# Patient Record
Sex: FEMALE | Race: WHITE | NOT HISPANIC OR LATINO | Employment: FULL TIME | URBAN - METROPOLITAN AREA
[De-identification: names, ages, dates, MRNs, and addresses within clinical notes are randomized per-mention and may not be internally consistent; named-entity substitution may affect disease eponyms.]

---

## 2023-04-02 ENCOUNTER — OFFICE VISIT (OUTPATIENT)
Dept: URGENT CARE | Age: 28
End: 2023-04-02

## 2023-04-02 VITALS
HEIGHT: 61 IN | OXYGEN SATURATION: 98 % | HEART RATE: 96 BPM | BODY MASS INDEX: 29.64 KG/M2 | WEIGHT: 157 LBS | TEMPERATURE: 98.1 F | RESPIRATION RATE: 20 BRPM

## 2023-04-02 DIAGNOSIS — J22 LOWER RESPIRATORY INFECTION: Primary | ICD-10-CM

## 2023-04-02 RX ORDER — ALBUTEROL SULFATE 90 UG/1
2 AEROSOL, METERED RESPIRATORY (INHALATION) EVERY 6 HOURS PRN
Qty: 8 G | Refills: 0 | Status: SHIPPED | OUTPATIENT
Start: 2023-04-02

## 2023-04-02 RX ORDER — AZITHROMYCIN 250 MG/1
TABLET, FILM COATED ORAL
Qty: 6 TABLET | Refills: 0 | Status: SHIPPED | OUTPATIENT
Start: 2023-04-02 | End: 2023-04-06

## 2023-04-02 RX ORDER — AZELASTINE 1 MG/ML
1 SPRAY, METERED NASAL 2 TIMES DAILY
Qty: 1 ML | Refills: 0 | Status: SHIPPED | OUTPATIENT
Start: 2023-04-02

## 2023-04-02 RX ORDER — BROMPHENIRAMINE MALEATE, PSEUDOEPHEDRINE HYDROCHLORIDE, AND DEXTROMETHORPHAN HYDROBROMIDE 2; 30; 10 MG/5ML; MG/5ML; MG/5ML
10 SYRUP ORAL 4 TIMES DAILY PRN
Qty: 120 ML | Refills: 0 | Status: SHIPPED | OUTPATIENT
Start: 2023-04-02

## 2023-04-02 NOTE — PROGRESS NOTES
[facility]        NAME: Griffin Townsend is a 32 y o  female  : 1995    MRN: [id number]  DATE: 2023  TIME: 10:54 AM    Assessment and Plan   Lower respiratory infection [J22]  1  Lower respiratory infection  azithromycin (ZITHROMAX) 250 mg tablet    brompheniramine-pseudoephedrine-DM 30-2-10 MG/5ML syrup    azelastine (ASTELIN) 0 1 % nasal spray    albuterol (PROVENTIL HFA,VENTOLIN HFA) 90 mcg/act inhaler            Patient Instructions     Take antibiotic as directed until completed  Use additional medications as directed for symptomatic relief as needed  Motrin and or Tylenol as needed for fever pain  Follow up with PCP in 3-5 days  Proceed to  ER if symptoms worsen  Chief Complaint     Chief Complaint   Patient presents with   • Cough     Cough, chest congestion, nasal congestion x 2 weeks         History of Present Illness       25-year-old female presents with runny nose congestion cough feeling rundown  Patient reports 2 weeks ago was diagnosed with the flu  Patient reports she was starting to feel better and then all of a sudden started to get worse again  Denies any fevers or chills  No abdominal pain or vomiting  Has had some bouts of nausea from time to time  Denies any sore throat or ear pain  Cough  This is a new problem  The current episode started 1 to 4 weeks ago  The problem has been gradually worsening  The problem occurs constantly  The cough is non-productive  Associated symptoms include nasal congestion, postnasal drip and rhinorrhea  Pertinent negatives include no chest pain, chills, ear pain, fever or shortness of breath  Nothing aggravates the symptoms  She has tried prescription cough suppressant for the symptoms  The treatment provided no relief  There is no history of asthma  Review of Systems   Review of Systems   Constitutional: Negative  Negative for chills and fever  HENT: Positive for postnasal drip and rhinorrhea  Negative for ear pain  "  Eyes: Negative  Respiratory: Positive for cough  Negative for shortness of breath  Cardiovascular: Negative  Negative for chest pain  Gastrointestinal: Negative  Musculoskeletal: Negative  Skin: Negative  Neurological: Negative  Current Medications       Current Outpatient Medications:   •  albuterol (PROVENTIL HFA,VENTOLIN HFA) 90 mcg/act inhaler, Inhale 2 puffs every 6 (six) hours as needed for wheezing or shortness of breath, Disp: 8 g, Rfl: 0  •  azelastine (ASTELIN) 0 1 % nasal spray, 1 spray into each nostril 2 (two) times a day Use in each nostril as directed, Disp: 1 mL, Rfl: 0  •  azithromycin (ZITHROMAX) 250 mg tablet, Take 2 tablets today then 1 tablet daily x 4 days, Disp: 6 tablet, Rfl: 0  •  brompheniramine-pseudoephedrine-DM 30-2-10 MG/5ML syrup, Take 10 mL by mouth 4 (four) times a day as needed for congestion or cough, Disp: 120 mL, Rfl: 0    Current Allergies     Allergies as of 04/02/2023   • (No Known Allergies)            The following portions of the patient's history were reviewed and updated as appropriate: allergies, current medications, past family history, past medical history, past social history, past surgical history and problem list      History reviewed  No pertinent past medical history  History reviewed  No pertinent surgical history  History reviewed  No pertinent family history  Medications have been verified  Objective   Pulse 96   Temp 98 1 °F (36 7 °C)   Resp 20   Ht 5' 1\" (1 549 m)   Wt 71 2 kg (157 lb)   SpO2 98%   BMI 29 66 kg/m²   No LMP recorded  Physical Exam     Physical Exam  Vitals and nursing note reviewed  Constitutional:       General: She is not in acute distress  Appearance: Normal appearance  She is well-developed  HENT:      Head: Normocephalic and atraumatic  Right Ear: Hearing, tympanic membrane, ear canal and external ear normal  There is no impacted cerumen        Left Ear: Hearing, " tympanic membrane, ear canal and external ear normal  There is no impacted cerumen  Nose: Congestion and rhinorrhea present  Mouth/Throat:      Pharynx: Uvula midline  No oropharyngeal exudate  Eyes:      General:         Right eye: No discharge  Left eye: No discharge  Conjunctiva/sclera: Conjunctivae normal    Cardiovascular:      Rate and Rhythm: Normal rate and regular rhythm  Heart sounds: Normal heart sounds  No murmur heard  Pulmonary:      Effort: Pulmonary effort is normal  No respiratory distress  Breath sounds: Decreased breath sounds (Mild throughout) present  No wheezing, rhonchi or rales  Abdominal:      General: Bowel sounds are normal       Palpations: Abdomen is soft  Tenderness: There is no abdominal tenderness  Musculoskeletal:         General: Normal range of motion  Cervical back: Normal range of motion and neck supple  Lymphadenopathy:      Cervical: No cervical adenopathy  Skin:     General: Skin is warm and dry  Neurological:      Mental Status: She is alert and oriented to person, place, and time     Psychiatric:         Mood and Affect: Mood normal

## 2023-04-02 NOTE — PATIENT INSTRUCTIONS
Take antibiotic as directed until completed  Use additional medications as directed for symptomatic relief as needed  Motrin and or Tylenol as needed for fever pain  Follow up with PCP in 3-5 days  Proceed to  ER if symptoms worsen  Pneumonia   AMBULATORY CARE:   Pneumonia  is an infection in your lungs caused by bacteria, viruses, fungi, or parasites  You can become infected if you come in contact with someone who is sick  You can get pneumonia if you recently had surgery or needed a ventilator to help you breathe  Pneumonia can also be caused by accidentally inhaling saliva or small pieces of food  Pneumonia may cause mild symptoms, or it can be severe and life-threatening  Common symptoms include the following:   Fever or chills    Cough    Shortness of breath or rapid breathing    Chest pain when you cough or breathe deeply    Headache    Vomiting    Fatigue or confusion    Seek care immediately if:   You cough up blood  Your heart beats more than 100 beats in 1 minute  You are very tired, confused, and cannot think clearly  You have chest pain or trouble breathing  Your lips or fingernails turn gray or blue  Call your doctor if:   Your symptoms are the same or get worse 48 hours after you start antibiotics  Your fever is not below 99°F (37 2°C) 48 hours after you start antibiotics  You have a fever higher than 101°F (38 3°C)  You cannot eat, or you have loss of appetite, nausea, or are vomiting  You have questions or concerns about your condition or care  Treatment  may include any of the following:  Antibiotics  help treat pneumonia caused by bacteria  Acetaminophen  decreases pain and fever  It is available without a doctor's order  Ask how much to take and how often to take it  Follow directions   Read the labels of all other medicines you are using to see if they also contain acetaminophen, or ask your doctor or pharmacist  Acetaminophen can cause liver damage if not taken correctly  NSAIDs , such as ibuprofen, help decrease swelling, pain, and fever  This medicine is available with or without a doctor's order  NSAIDs can cause stomach bleeding or kidney problems in certain people  If you take blood thinner medicine, always ask your healthcare provider if NSAIDs are safe for you  Always read the medicine label and follow directions  Airway clearance techniques  are exercises to help remove mucus so you can breathe more easily  Your healthcare provider will show you how to do the exercises  These exercises may be used along with machines or devices to help decrease your symptoms  Respiratory support  is given to help you breathe  You may receive oxygen to increase the level of oxygen in your blood  You may also need a machine to help you breathe  Manage your symptoms:   Rest as needed  Rest often throughout the day  Alternate times of activity with times of rest     Drink liquids as directed  Ask how much liquid to drink each day and which liquids are best for you  Liquids help thin your mucus, which may make it easier for you to cough it up  Do not smoke  Smoking increases your risk for pneumonia  Smoking also makes it harder for you to get better after you have had pneumonia  Ask your healthcare provider for information if you need help to quit smoking  Avoid secondhand smoke  Limit alcohol  Women should limit alcohol to 1 drink a day  Men should limit alcohol to 2 drinks a day  A drink of alcohol is 12 ounces of beer, 5 ounces of wine, or 1½ ounces of liquor  Use a cool mist humidifier  A humidifier will help increase air moisture in your home  This may make it easier for you to breathe and help decrease your cough  Keep your head elevated  You may be able to breathe better if you keep your head and upper body elevated  Prevent pneumonia:   Wash your hands often  Use soap and water  Wash for at least 20 seconds   Rinse with warm, running water for several seconds  Then dry your hands with a clean towel or paper towel  Use hand  that contains alcohol if soap and water are not available  Do not touch your eyes, nose, or mouth without washing your hands first          Cover a sneeze or cough  Use a tissue that covers your mouth and nose  Throw the tissue away in a trash can right away  Use the bend of your arm if a tissue is not available  Wash your hands well with soap and water or use a hand   Do not stand close to anyone who is sneezing or coughing  Stay away from others until you are well  Do not go to work or other activities  Wait until your symptoms are gone or your healthcare provider says it is okay to return  Ask about vaccines you may need  A pneumonia vaccine can help lower your risk for pneumonia  The vaccine may be recommended every 5 years, starting at age 72  Other vaccines help lower the risk for infections that can become serious for a person who has pneumonia  Get a flu vaccine each year as soon as recommended, usually in September or October  Get a COVID-19 vaccine and booster as directed  Your healthcare provider can tell you if you should also get other vaccines, and when to get them  Follow up with your doctor as directed: You will need to return for more tests  Write down your questions so you remember to ask them during your visits  © Copyright [employer] 2022 Information is for End User's use only and may not be sold, redistributed or otherwise used for commercial purposes  The above information is an  only  It is not intended as medical advice for individual conditions or treatments  Talk to your doctor, nurse or pharmacist before following any medical regimen to see if it is safe and effective for you

## 2024-10-15 ENCOUNTER — APPOINTMENT (OUTPATIENT)
Dept: RADIOLOGY | Age: 29
End: 2024-10-15
Payer: COMMERCIAL

## 2024-10-15 ENCOUNTER — OFFICE VISIT (OUTPATIENT)
Dept: URGENT CARE | Age: 29
End: 2024-10-15
Payer: COMMERCIAL

## 2024-10-15 VITALS
RESPIRATION RATE: 18 BRPM | DIASTOLIC BLOOD PRESSURE: 72 MMHG | OXYGEN SATURATION: 98 % | SYSTOLIC BLOOD PRESSURE: 126 MMHG | HEART RATE: 71 BPM | BODY MASS INDEX: 30.04 KG/M2 | WEIGHT: 159 LBS | TEMPERATURE: 98.1 F

## 2024-10-15 DIAGNOSIS — S63.501A SPRAIN OF RIGHT WRIST, INITIAL ENCOUNTER: ICD-10-CM

## 2024-10-15 DIAGNOSIS — S49.91XA INJURY OF RIGHT UPPER EXTREMITY, INITIAL ENCOUNTER: Primary | ICD-10-CM

## 2024-10-15 DIAGNOSIS — S49.91XA INJURY OF RIGHT UPPER EXTREMITY, INITIAL ENCOUNTER: ICD-10-CM

## 2024-10-15 PROCEDURE — 73110 X-RAY EXAM OF WRIST: CPT

## 2024-10-15 PROCEDURE — 73090 X-RAY EXAM OF FOREARM: CPT

## 2024-10-15 PROCEDURE — S9083 URGENT CARE CENTER GLOBAL: HCPCS | Performed by: EMERGENCY MEDICINE

## 2024-10-15 PROCEDURE — G0382 LEV 3 HOSP TYPE B ED VISIT: HCPCS | Performed by: EMERGENCY MEDICINE

## 2024-10-15 RX ORDER — VENLAFAXINE HYDROCHLORIDE 37.5 MG/1
37.5 CAPSULE, EXTENDED RELEASE ORAL DAILY
COMMUNITY
Start: 2024-09-06

## 2024-10-15 RX ORDER — NAPROXEN 500 MG/1
500 TABLET ORAL 2 TIMES DAILY WITH MEALS
Qty: 60 TABLET | Refills: 0 | Status: SHIPPED | OUTPATIENT
Start: 2024-10-15 | End: 2024-11-14

## 2024-10-15 NOTE — PROGRESS NOTES
[facility]        NAME: Jenae Bose is a 29 y.o. female  : 1995    MRN: [id number]  DATE: October 15, 2024  TIME: 5:41 PM    Assessment and Plan   Injury of right upper extremity, initial encounter [S49.91XA]  1. Injury of right upper extremity, initial encounter  XR wrist 3+ vw right    XR forearm 2 vw right    naproxen (Naprosyn) 500 mg tablet    Ambulatory Referral to Orthopedic Surgery      2. Sprain of right wrist, initial encounter  Ambulatory Referral to Orthopedic Surgery            Patient Instructions       Follow up with PCP in 3-5 days.  Proceed to  ER if symptoms worsen.    If tests have been performed at [facility], our office will contact you with results if changes need to be made to the care plan discussed with you at the visit.  You can review your full results on [facility] MyChart.    Chief Complaint     Chief Complaint   Patient presents with    Wrist Injury     Right wrist  States was walking and fell and used arm to brace fall  Sates pain is in wrist and lower arm         History of Present Illness       29-year-old right-handed female presents with a chief complaint of right wrist and forearm pain after falling on outstretched hand approximately 2 days ago while on a balcony.  Patient states that she initially fell onto her outstretched right hand and noticed pain immediately afterward.  She states that she has noticed some mild swelling on the medial portion of her right wrist as well as pain with movement since the injury.  She denies any numbness tingling or weakness in the right hand.  Denies additional injury, head strike or loss of consciousness at time of injury.    Wrist Pain   The pain is present in the right wrist and right arm. This is a new problem. The current episode started in the past 7 days. There has been a history of trauma. The problem occurs constantly. The problem has been waxing and waning. The pain is at a severity of 5/10. The pain is moderate.  Associated symptoms include joint swelling and stiffness. Pertinent negatives include no fever, inability to bear weight, itching, joint locking, limited range of motion, numbness or tingling. The symptoms are aggravated by activity. She has tried nothing for the symptoms.       Review of Systems   Review of Systems   Constitutional:  Negative for activity change, appetite change, chills, diaphoresis, fatigue, fever and unexpected weight change.   HENT:  Negative for congestion, dental problem, drooling, ear discharge, ear pain, facial swelling, hearing loss, mouth sores, nosebleeds, postnasal drip, rhinorrhea, sinus pressure, sinus pain, sneezing, sore throat, tinnitus, trouble swallowing and voice change.    Eyes:  Negative for pain and visual disturbance.   Respiratory:  Negative for cough and shortness of breath.    Cardiovascular:  Negative for chest pain and palpitations.   Gastrointestinal:  Negative for abdominal pain and vomiting.   Genitourinary:  Negative for dysuria and hematuria.   Musculoskeletal:  Positive for arthralgias, joint swelling and stiffness. Negative for back pain, gait problem, myalgias, neck pain and neck stiffness.   Skin:  Negative for color change, itching, pallor, rash and wound.   Neurological:  Negative for dizziness, tingling, tremors, seizures, syncope, facial asymmetry, speech difficulty, weakness, light-headedness, numbness and headaches.   All other systems reviewed and are negative.        Current Medications       Current Outpatient Medications:     naproxen (Naprosyn) 500 mg tablet, Take 1 tablet (500 mg total) by mouth 2 (two) times a day with meals, Disp: 60 tablet, Rfl: 0    venlafaxine (EFFEXOR-XR) 37.5 mg 24 hr capsule, Take 37.5 mg by mouth daily, Disp: , Rfl:     albuterol (PROVENTIL HFA,VENTOLIN HFA) 90 mcg/act inhaler, Inhale 2 puffs every 6 (six) hours as needed for wheezing or shortness of breath (Patient not taking: Reported on 10/15/2024), Disp: 8 g, Rfl: 0     azelastine (ASTELIN) 0.1 % nasal spray, 1 spray into each nostril 2 (two) times a day Use in each nostril as directed (Patient not taking: Reported on 10/15/2024), Disp: 1 mL, Rfl: 0    brompheniramine-pseudoephedrine-DM 30-2-10 MG/5ML syrup, Take 10 mL by mouth 4 (four) times a day as needed for congestion or cough (Patient not taking: Reported on 10/15/2024), Disp: 120 mL, Rfl: 0    Current Allergies     Allergies as of 10/15/2024    (No Known Allergies)            The following portions of the patient's history were reviewed and updated as appropriate: allergies, current medications, past family history, past medical history, past social history, past surgical history and problem list.     No past medical history on file.    No past surgical history on file.    No family history on file.      Medications have been verified.        Objective   /72   Pulse 71   Temp 98.1 °F (36.7 °C)   Resp 18   Wt 72.1 kg (159 lb)   SpO2 98%   BMI 30.04 kg/m²   No LMP recorded.       Physical Exam     Physical Exam  Vitals and nursing note reviewed.   Constitutional:       General: She is not in acute distress.     Appearance: Normal appearance. She is not ill-appearing, toxic-appearing or diaphoretic.   HENT:      Head: Normocephalic and atraumatic.      Right Ear: External ear normal.      Left Ear: External ear normal.      Nose: Nose normal.      Mouth/Throat:      Mouth: Mucous membranes are moist.      Pharynx: No oropharyngeal exudate or posterior oropharyngeal erythema.   Eyes:      General: No scleral icterus.        Right eye: No discharge.         Left eye: No discharge.      Extraocular Movements: Extraocular movements intact.      Pupils: Pupils are equal, round, and reactive to light.   Cardiovascular:      Rate and Rhythm: Normal rate and regular rhythm.      Pulses: Normal pulses.           Carotid pulses are 2+ on the right side and 2+ on the left side.       Radial pulses are 2+ on the right side and  2+ on the left side.      Heart sounds: No murmur heard.     No friction rub. No gallop.   Pulmonary:      Effort: Pulmonary effort is normal. No respiratory distress.      Breath sounds: Normal breath sounds. No stridor. No wheezing, rhonchi or rales.   Chest:      Chest wall: No tenderness.   Abdominal:      Tenderness: There is no guarding.   Musculoskeletal:         General: Swelling, tenderness and signs of injury present. No deformity.      Right shoulder: Normal.      Left shoulder: Normal.      Right upper arm: Normal.      Left upper arm: Normal.      Right elbow: Normal.      Left elbow: Normal.      Right forearm: Swelling, tenderness and bony tenderness present. No edema, deformity or lacerations.      Left forearm: Normal.      Right wrist: Swelling, tenderness and bony tenderness present. No deformity, effusion, lacerations, snuff box tenderness or crepitus. Normal range of motion. Normal pulse.      Left wrist: Normal.      Right hand: Normal.      Left hand: Normal.      Cervical back: Normal, normal range of motion and neck supple.      Thoracic back: Normal.      Lumbar back: Normal.      Right lower leg: No edema.      Left lower leg: No edema.   Skin:     General: Skin is warm and dry.      Coloration: Skin is not jaundiced or pale.      Findings: No bruising, erythema, lesion or rash.   Neurological:      General: No focal deficit present.      Mental Status: She is alert and oriented to person, place, and time.      Cranial Nerves: No cranial nerve deficit.      Sensory: No sensory deficit.      Motor: No weakness.      Coordination: Coordination normal.      Gait: Gait normal.   Psychiatric:         Mood and Affect: Mood normal.         Behavior: Behavior normal.

## 2024-10-15 NOTE — LETTER
October 15, 2024     Patient: Jenae Bose   YOB: 1995   Date of Visit: 10/15/2024       To Whom It May Concern:    It is my medical opinion that Jenae Bose may return to light duty immediately with the following restrictions: please allow wear of wrist brace as needed,  . Repetitive motion activities (w or w/o splint) not more than 4 times/hr; repetitive keying up to 15 keystrokes/min not more than 2 hrs/day; gripping and using light tools (pens, scissors, etc.) with 5-minute break at least every 20 min; no pinching; driving car up to 2 hrs/day; light work up to 5 lbs [2 kg] 3 times/hr; avoidance of prolonged periods in wrist flexion or extension.    If you have any questions or concerns, please don't hesitate to call.         Sincerely,        Alexander Montemayor, DO    CC: No Recipients

## 2024-10-23 ENCOUNTER — OFFICE VISIT (OUTPATIENT)
Dept: OBGYN CLINIC | Facility: MEDICAL CENTER | Age: 29
End: 2024-10-23
Payer: COMMERCIAL

## 2024-10-23 ENCOUNTER — APPOINTMENT (OUTPATIENT)
Dept: RADIOLOGY | Facility: MEDICAL CENTER | Age: 29
End: 2024-10-23
Payer: COMMERCIAL

## 2024-10-23 VITALS
DIASTOLIC BLOOD PRESSURE: 85 MMHG | HEIGHT: 61 IN | HEART RATE: 73 BPM | BODY MASS INDEX: 30.02 KG/M2 | WEIGHT: 159 LBS | SYSTOLIC BLOOD PRESSURE: 126 MMHG

## 2024-10-23 DIAGNOSIS — S63.501A SPRAIN OF RIGHT WRIST, INITIAL ENCOUNTER: ICD-10-CM

## 2024-10-23 DIAGNOSIS — S49.91XA INJURY OF RIGHT UPPER EXTREMITY, INITIAL ENCOUNTER: ICD-10-CM

## 2024-10-23 PROCEDURE — 73100 X-RAY EXAM OF WRIST: CPT

## 2024-10-23 PROCEDURE — 99203 OFFICE O/P NEW LOW 30 MIN: CPT | Performed by: ORTHOPAEDIC SURGERY

## 2024-10-23 NOTE — PROGRESS NOTES
The HAND & UPPER EXTREMITY OFFICE VISIT   Referred By:  Alexander Montemayor, Do  [address] [place] [address]      Chief Complaint:     Right wrist pain    History of Present Illness:   29 y.o., right hand dominant female presents with right wrist pain that began after a fall onto her outstretched hand 10/13/2024. She did notice immediate pain and swelling, with difficulty moving the wrist since the injury. She presented to a [facility] two days after the injury where x-rays were obtained and did not demonstrate any acute fracture. She describes intermittent pain that has been gradually improving, aching and uncomfortable in nature. Pain also occurs in the volar aspect of her small finger with associated weakness and catching sensation. Pain is worsened with motion of the wrist, pinching, lifting. Pain is improved with rest, ice, Naproxen, and use of cock up wrist brace.       ADLs: Community ambulator  Smoke: Denies   ETOH: socially   Drugs:  Denies  Job:        Past Medical History:  No past medical history on file.  No past surgical history on file.  No family history on file.  Social History     Socioeconomic History    Marital status: Single     Spouse name: Not on file    Number of children: Not on file    Years of education: Not on file    Highest education level: Not on file   Occupational History    Not on file   Tobacco Use    Smoking status: Never    Smokeless tobacco: Never   Substance and Sexual Activity    Alcohol use: Never    Drug use: Never    Sexual activity: Not on file   Other Topics Concern    Not on file   Social History Narrative    Not on file     Social Determinants of Health     Financial Resource Strain: Not on file   Food Insecurity: Not on file   Transportation Needs: Not on file   Physical Activity: Not on file   Stress: Not on file   Social Connections: Not on file   Intimate Partner Violence: Not on file   Housing Stability: Not on file     Scheduled  "Meds:  Continuous Infusions:No current facility-administered medications for this visit.    PRN Meds:.  No Known Allergies        Physical Examination:    /85   Pulse 73   Ht 5' 1\" (1.549 m)   Wt 72.1 kg (159 lb)   BMI 30.04 kg/m²     Gen: A&Ox3, NAD  Cardiac: regular rate  Chest: non labored breathing  Abdomen: Non-distended    Cervcial Spine: Not tested     Right Upper Extremity:  Skin CDI  No obvious deformity of the shoulder, arm, elbow, forearm, wrist, hand  TTP TFCC fovea,  ulnar joint line, hook of hamate  ROM limited due to stiffness  5/5 strength with wrist flexion, extension, finger flexion and extension  Brisk capillary refill  Sensation intact to Ax/R/M/U nerve distributions      Studies:  Radiographs: I personally reviewed and independently interpreted the available radiographs.  10/15/2024: Radiographs of the right wrist, multiple views, demonstrate no acute osseous abnormalities.   10/23/2024: Radiographs of the right wrist, carpal tunnel view, demonstrate no fracture of the hook of the hamate or any other osseous abnormalities      Assessment and Plan:  1. Injury of right upper extremity, initial encounter  Ambulatory Referral to Orthopedic Surgery    XR wrist 2 vw right      2. Sprain of right wrist, initial encounter  Ambulatory Referral to Orthopedic Surgery    XR wrist 2 vw right          29 y.o. female presents with signs and symptoms consistent with the above diagnosis.  We discussed the natural history of this condition and its pathogenesis.  We discussed operative and nonoperative treatment options.  X-ray of the right wrist with carpal tunnel view obtained today and reviewed with the patient to confirm absence of hook of the hamate fracture given complaints of catching with flexion and extension of the fingers. No catching or abnormal movement on exam today.  With no evidence of fracture on x-ray, a suspect a joint sprain that should continue to gradually improve with nonoperative " treatments including continuation of cock-up wrist splint as needed, ice and oral medication as needed for pain relief.  She should perform range of motion exercises of the wrist and hand at least twice daily to maintain range of motion.  If symptoms persist or worsen, we can consider ordering an MRI to further evaluate for cause of her pain.  Note was provided today allowing Jenae to return to work on light duty for the right upper extremity for the next 2 weeks.    she expressed understanding of the plan and agreed. We encouraged them to contact our office with any questions or concerns.         Epifanio Dominguez MD  Hand and Upper Extremity Surgery    Scribe Attestation      I,:  Alison Crooks am acting as a scribe while in the presence of the attending physician.:       I,:  Epifanio Dominguez MD personally performed the services described in this documentation    as scribed in my presence.:               *This note was dictated using Dragon voice recognition software. Please excuse any word substitutions or errors.*

## 2024-10-23 NOTE — LETTER
October 23, 2024     Patient: Jenae Bose  YOB: 1995  Date of Visit: 10/23/2024      To Whom it May Concern:    Jenae Bose is under my professional care. Jenae was seen in my office on 10/23/2024. Jenae may return to work with limitations light duty right upper extremity for the next two weeks .    If you have any questions or concerns, please don't hesitate to call.         Sincerely,          Epifanio Dominguez MD        CC: No Recipients

## 2024-10-24 ENCOUNTER — TELEPHONE (OUTPATIENT)
Age: 29
End: 2024-10-24

## 2024-10-24 NOTE — TELEPHONE ENCOUNTER
Caller: Patient     Doctor: Alberto,     Reason for call: Patient called regarding there work note. Patient states that the restrictions for light duty need to be specify. Patient stated the work note from 10/15 from urgent care list the restrictions that can be followed. Please adjust work note on 10/23. Please call patient with updated note.    Call back#: [phone]

## 2025-01-07 ENCOUNTER — OFFICE VISIT (OUTPATIENT)
Dept: URGENT CARE | Age: 30
End: 2025-01-07
Payer: COMMERCIAL

## 2025-01-07 VITALS
HEART RATE: 88 BPM | SYSTOLIC BLOOD PRESSURE: 118 MMHG | RESPIRATION RATE: 18 BRPM | DIASTOLIC BLOOD PRESSURE: 84 MMHG | TEMPERATURE: 100.3 F | OXYGEN SATURATION: 98 %

## 2025-01-07 DIAGNOSIS — J01.00 ACUTE NON-RECURRENT MAXILLARY SINUSITIS: Primary | ICD-10-CM

## 2025-01-07 PROCEDURE — G0382 LEV 3 HOSP TYPE B ED VISIT: HCPCS | Performed by: EMERGENCY MEDICINE

## 2025-01-07 PROCEDURE — S9083 URGENT CARE CENTER GLOBAL: HCPCS | Performed by: EMERGENCY MEDICINE

## 2025-01-07 RX ORDER — METHYLPREDNISOLONE 4 MG/1
TABLET ORAL
Qty: 21 TABLET | Refills: 0 | Status: SHIPPED | OUTPATIENT
Start: 2025-01-07

## 2025-01-07 NOTE — PROGRESS NOTES
[facility]        NAME: Jenae Bose is a 29 y.o. female  : 1995    MRN: [id number]  DATE: 2025  TIME: 8:12 AM    Assessment and Plan   Acute non-recurrent maxillary sinusitis [J01.00]  1. Acute non-recurrent maxillary sinusitis          Maxillary sinus congestion, pain. Patient states her teeth hurt. Has tried many OTC medications and is still congested. Sinus pressure / TTP. BS clear.     Patient Instructions       Follow up with PCP in 3-5 days.  Proceed to  ER if symptoms worsen.    If tests have been performed at [facility], our office will contact you with results if changes need to be made to the care plan discussed with you at the visit.  You can review your full results on [facility].    Chief Complaint     Chief Complaint   Patient presents with    Cold Like Symptoms     Ongoing since Saturday night  Has been having congestion, facial pain behind nose, eyes, and ears   Generalized body aches          History of Present Illness       Maxillary sinus congestion, pain. Patient states her teeth hurt. Has tried many OTC medications and is still congested. Sinus pressure / TTP. BS clear.         Review of Systems   Review of Systems   Constitutional:  Negative for fever.   HENT:  Positive for congestion, postnasal drip, sinus pressure and sinus pain.    Respiratory:  Negative for cough and wheezing.    All other systems reviewed and are negative.        Current Medications       Current Outpatient Medications:     venlafaxine (EFFEXOR-XR) 37.5 mg 24 hr capsule, Take 37.5 mg by mouth daily, Disp: , Rfl:     albuterol (PROVENTIL HFA,VENTOLIN HFA) 90 mcg/act inhaler, Inhale 2 puffs every 6 (six) hours as needed for wheezing or shortness of breath (Patient not taking: Reported on 10/15/2024), Disp: 8 g, Rfl: 0    azelastine (ASTELIN) 0.1 % nasal spray, 1 spray into each nostril 2 (two) times a day Use in each nostril as directed (Patient not taking: Reported on 10/15/2024), Disp:  1 mL, Rfl: 0    brompheniramine-pseudoephedrine-DM 30-2-10 MG/5ML syrup, Take 10 mL by mouth 4 (four) times a day as needed for congestion or cough (Patient not taking: Reported on 10/15/2024), Disp: 120 mL, Rfl: 0    naproxen (Naprosyn) 500 mg tablet, Take 1 tablet (500 mg total) by mouth 2 (two) times a day with meals, Disp: 60 tablet, Rfl: 0    Current Allergies     Allergies as of 01/07/2025    (No Known Allergies)            The following portions of the patient's history were reviewed and updated as appropriate: allergies, current medications, past family history, past medical history, past social history, past surgical history and problem list.     No past medical history on file.    No past surgical history on file.    No family history on file.      Medications have been verified.        Objective   /84   Pulse 88   Temp 100.3 °F (37.9 °C)   Resp 18   SpO2 98%   No LMP recorded.       Physical Exam     Physical Exam  Vitals reviewed.   Constitutional:       Appearance: Normal appearance.   HENT:      Nose: Congestion and rhinorrhea present.      Right Sinus: Maxillary sinus tenderness present.      Left Sinus: Maxillary sinus tenderness present.   Cardiovascular:      Rate and Rhythm: Normal rate and regular rhythm.      Pulses: Normal pulses.      Heart sounds: Normal heart sounds.   Pulmonary:      Effort: Pulmonary effort is normal.      Breath sounds: Normal breath sounds.   Lymphadenopathy:      Cervical: No cervical adenopathy.   Neurological:      Mental Status: She is alert.

## 2025-01-07 NOTE — PATIENT INSTRUCTIONS
Pseudoephedrine ( 30 mg)     Benadryl at night    Vicks    Mucinex    Flonase     May consider Afrin nasal spray ( NO MORE THAN 2 DAYS)

## 2025-01-13 ENCOUNTER — TELEMEDICINE (OUTPATIENT)
Dept: OTHER | Facility: HOSPITAL | Age: 30
End: 2025-01-13
Payer: COMMERCIAL

## 2025-01-13 DIAGNOSIS — R11.0 NAUSEA: Primary | ICD-10-CM

## 2025-01-13 DIAGNOSIS — R09.82 POST-NASAL DRIP: ICD-10-CM

## 2025-01-13 PROCEDURE — 99213 OFFICE O/P EST LOW 20 MIN: CPT | Performed by: NURSE PRACTITIONER

## 2025-01-13 RX ORDER — FLUTICASONE PROPIONATE 50 MCG
1 SPRAY, SUSPENSION (ML) NASAL DAILY
Qty: 9.9 ML | Refills: 0 | Status: SHIPPED | OUTPATIENT
Start: 2025-01-13 | End: 2025-01-24

## 2025-01-13 RX ORDER — ONDANSETRON 4 MG/1
4 TABLET, FILM COATED ORAL EVERY 8 HOURS PRN
Qty: 20 TABLET | Refills: 0 | Status: SHIPPED | OUTPATIENT
Start: 2025-01-13 | End: 2025-01-24

## 2025-01-13 NOTE — PROGRESS NOTES
Virtual Regular Visit  Name: Jenae Bose      : 1995      MRN: [id number]  Encounter Provider: LUIS Park  Encounter Date: 2025   Encounter department: VIRTUAL CARE       Verification of patient location:  Patient is located at Home in the following state in which I hold an active license [place] :  Assessment & Plan  Nausea    Orders:    ondansetron (ZOFRAN) 4 mg tablet; Take 1 tablet (4 mg total) by mouth every 8 (eight) hours as needed for nausea or vomiting    Post-nasal drip    Orders:    fluticasone (FLONASE) 50 mcg/act nasal spray; 1 spray into each nostril daily        Encounter provider LUIS Park    The patient was identified by name and date of birth. Jenae Bose was informed that this is a telemedicine visit and that the visit is being conducted through the Epic Embedded platform. She agrees to proceed..  My office door was closed. No one else was in the room.  She acknowledged consent and understanding of privacy and security of the video platform. The patient has agreed to participate and understands they can discontinue the visit at any time.    Patient is aware this is a billable service.     History was obtained from: History obtained from: patient  History of Present Illness     Patient presents via video visit for nausea. Is being treated with augmentin and medrol dose pack for sinus infection. States that infection is improving. PND persists.       Review of Systems   Constitutional:  Negative for chills, diaphoresis, fatigue and fever.   HENT:  Positive for congestion, postnasal drip and rhinorrhea. Negative for ear pain, facial swelling, sinus pressure, sinus pain, sore throat, tinnitus and trouble swallowing.    Eyes: Negative.    Respiratory:  Negative for cough, chest tightness, shortness of breath, wheezing and stridor.    Cardiovascular:  Negative for chest pain and palpitations.   Gastrointestinal:  Positive for nausea. Negative for abdominal pain,  diarrhea and vomiting.   Musculoskeletal:  Negative for arthralgias, back pain, joint swelling, myalgias, neck pain and neck stiffness.   Neurological:  Negative for dizziness, facial asymmetry, weakness, light-headedness, numbness and headaches.       Objective   LMP 01/04/2025     Physical Exam  Constitutional:       General: She is not in acute distress.     Appearance: She is well-developed. She is not diaphoretic.   HENT:      Nose: Congestion present.      Right Sinus: No maxillary sinus tenderness or frontal sinus tenderness.      Left Sinus: No maxillary sinus tenderness or frontal sinus tenderness.      Mouth/Throat:      Tonsils: No tonsillar exudate.   Cardiovascular:      Comments: Unable to assess in this setting  Pulmonary:      Effort: Pulmonary effort is normal.      Comments: Patient able to converse in full sentences, easy non-labored respirations noted. No dyspnea observed.      Lymphadenopathy:      Cervical: No cervical adenopathy.   Skin:     Coloration: Skin is not pale.   Neurological:      Mental Status: She is alert and oriented to person, place, and time.         Visit Time  Total Visit Duration: 15 minutes not including the time spent for establishing the audio/video connection.

## 2025-01-13 NOTE — PATIENT INSTRUCTIONS
Take zofran as needed. Flonase prescribed for PND. Recommended saline spray and saline rinse. Take augmentin with food. Recommended oral probiotic or yogurt with live culture. Return precautions discussed. Verbalizes understanding.

## 2025-01-22 ENCOUNTER — OFFICE VISIT (OUTPATIENT)
Dept: URGENT CARE | Age: 30
End: 2025-01-22
Payer: COMMERCIAL

## 2025-01-22 VITALS
TEMPERATURE: 101.2 F | DIASTOLIC BLOOD PRESSURE: 84 MMHG | RESPIRATION RATE: 20 BRPM | OXYGEN SATURATION: 97 % | SYSTOLIC BLOOD PRESSURE: 122 MMHG | HEART RATE: 122 BPM

## 2025-01-22 DIAGNOSIS — R50.9 FEVER, UNSPECIFIED FEVER CAUSE: Primary | ICD-10-CM

## 2025-01-22 DIAGNOSIS — J06.9 VIRAL URI WITH COUGH: ICD-10-CM

## 2025-01-22 DIAGNOSIS — R11.0 NAUSEA: ICD-10-CM

## 2025-01-22 PROCEDURE — G0382 LEV 3 HOSP TYPE B ED VISIT: HCPCS | Performed by: EMERGENCY MEDICINE

## 2025-01-22 PROCEDURE — 87636 SARSCOV2 & INF A&B AMP PRB: CPT | Performed by: EMERGENCY MEDICINE

## 2025-01-22 PROCEDURE — S9083 URGENT CARE CENTER GLOBAL: HCPCS | Performed by: EMERGENCY MEDICINE

## 2025-01-22 RX ORDER — ACETAMINOPHEN 325 MG/1
975 TABLET ORAL ONCE
Status: COMPLETED | OUTPATIENT
Start: 2025-01-22 | End: 2025-01-22

## 2025-01-22 RX ORDER — IBUPROFEN 400 MG/1
400 TABLET, FILM COATED ORAL ONCE
Status: COMPLETED | OUTPATIENT
Start: 2025-01-22 | End: 2025-01-22

## 2025-01-22 RX ORDER — ONDANSETRON 8 MG/1
8 TABLET, ORALLY DISINTEGRATING ORAL EVERY 8 HOURS PRN
Qty: 20 TABLET | Refills: 0 | Status: SHIPPED | OUTPATIENT
Start: 2025-01-22 | End: 2025-02-11

## 2025-01-22 RX ORDER — ONDANSETRON 4 MG/1
8 TABLET, ORALLY DISINTEGRATING ORAL ONCE
Status: COMPLETED | OUTPATIENT
Start: 2025-01-22 | End: 2025-01-22

## 2025-01-22 RX ADMIN — ACETAMINOPHEN 975 MG: 325 TABLET ORAL at 12:26

## 2025-01-22 RX ADMIN — ONDANSETRON 8 MG: 4 TABLET, ORALLY DISINTEGRATING ORAL at 12:25

## 2025-01-22 RX ADMIN — IBUPROFEN 400 MG: 400 TABLET, FILM COATED ORAL at 12:26

## 2025-01-22 NOTE — PROGRESS NOTES
[facility]        NAME: Jenae Bose is a 29 y.o. female  : 1995    MRN: [id number]  DATE: 2025  TIME: 12:14 PM    Assessment and Plan   Fever, unspecified fever cause [R50.9]  1. Fever, unspecified fever cause  acetaminophen (TYLENOL) tablet 975 mg    ibuprofen (MOTRIN) tablet 400 mg      2. Nausea  ondansetron (ZOFRAN-ODT) dispersible tablet 8 mg    ondansetron (ZOFRAN-ODT) 8 mg disintegrating tablet      3. Viral URI with cough  Covid19 and INFLUENZA A/B PCR        Patient noted to have multiple areas of mild abdominal discomfort to palpation however no overt rigidity or peritoneal signs noted.  Advised patient this is likely secondary to viral illness however if pain or symptoms worsen, could be indicative of other etiologies such as appendicitis.  Advised patient to seek care in ED if her symptoms persist or worsen, otherwise follow-up closely with her PCP as directed.  Patient states that she does not wish to go to the ER directly from clinic however will monitor her symptoms and if they worsen she will then proceed immediately to the emergency room for evaluation.  All questions were answered at bedside, patient was amenable to plan and voiced understanding.    Patient Instructions       Follow up with PCP in 3-5 days.  Proceed to  ER if symptoms worsen.    If tests have been performed at [facility], our office will contact you with results if changes need to be made to the care plan discussed with you at the visit.  You can review your full results on [facility].    Chief Complaint     Chief Complaint   Patient presents with    Cold Like Symptoms     States started with cough last nigh  Body aches, headaches, nausea, nasal drainage  Was given zofran for nausea   Fever today of 101          History of Present Illness       29-year-old female presents with a chief complaint of generalized bodyaches, bilateral headache, nasal congestion, dry nonproductive cough nausea and  watery nonbloody diarrhea which began yesterday.  Patient also endorses associated fever and chills, and is endorsing some intermittent, generalized abdominal cramping and discomfort.  Denies vomiting, chest pain or shortness of breath.  Additionally denies any visual or speech changes, focal numbness tingling or weakness in extremities.    URI   This is a new problem. The current episode started yesterday. The problem has been waxing and waning. The maximum temperature recorded prior to her arrival was 102 - 102.9 F. Associated symptoms include congestion, coughing, nausea, rhinorrhea, sinus pain, sneezing, a sore throat and swollen glands. Pertinent negatives include no abdominal pain, chest pain, diarrhea, dysuria, ear pain, headaches, joint pain, joint swelling, neck pain, plugged ear sensation, rash, vomiting or wheezing.       Review of Systems   Review of Systems   Constitutional:  Negative for activity change, appetite change, chills, diaphoresis, fatigue, fever and unexpected weight change.   HENT:  Positive for congestion, postnasal drip, rhinorrhea, sinus pressure, sinus pain, sneezing and sore throat. Negative for dental problem, drooling, ear discharge, ear pain, facial swelling, hearing loss, mouth sores, nosebleeds, tinnitus, trouble swallowing and voice change.    Eyes:  Negative for photophobia, pain, discharge, redness, itching and visual disturbance.   Respiratory:  Positive for cough. Negative for apnea, choking, chest tightness, shortness of breath, wheezing and stridor.    Cardiovascular:  Negative for chest pain, palpitations and leg swelling.   Gastrointestinal:  Positive for nausea. Negative for abdominal distention, abdominal pain, anal bleeding, blood in stool, constipation, diarrhea, rectal pain and vomiting.   Genitourinary:  Negative for dysuria and hematuria.   Musculoskeletal:  Positive for myalgias. Negative for arthralgias, back pain, gait problem, joint pain, joint swelling, neck  pain and neck stiffness.   Skin:  Negative for color change and rash.   Neurological:  Negative for dizziness, tremors, seizures, syncope, facial asymmetry, speech difficulty, weakness, light-headedness, numbness and headaches.   All other systems reviewed and are negative.        Current Medications       Current Outpatient Medications:     ondansetron (ZOFRAN-ODT) 8 mg disintegrating tablet, Take 1 tablet (8 mg total) by mouth every 8 (eight) hours as needed for nausea or vomiting for up to 20 days, Disp: 20 tablet, Rfl: 0    albuterol (PROVENTIL HFA,VENTOLIN HFA) 90 mcg/act inhaler, Inhale 2 puffs every 6 (six) hours as needed for wheezing or shortness of breath (Patient not taking: Reported on 10/15/2024), Disp: 8 g, Rfl: 0    azelastine (ASTELIN) 0.1 % nasal spray, 1 spray into each nostril 2 (two) times a day Use in each nostril as directed (Patient not taking: Reported on 10/15/2024), Disp: 1 mL, Rfl: 0    brompheniramine-pseudoephedrine-DM 30-2-10 MG/5ML syrup, Take 10 mL by mouth 4 (four) times a day as needed for congestion or cough (Patient not taking: Reported on 10/15/2024), Disp: 120 mL, Rfl: 0    fluticasone (FLONASE) 50 mcg/act nasal spray, 1 spray into each nostril daily, Disp: 9.9 mL, Rfl: 0    methylPREDNISolone 4 MG tablet therapy pack, Use as directed on package, Disp: 21 tablet, Rfl: 0    naproxen (Naprosyn) 500 mg tablet, Take 1 tablet (500 mg total) by mouth 2 (two) times a day with meals, Disp: 60 tablet, Rfl: 0    ondansetron (ZOFRAN) 4 mg tablet, Take 1 tablet (4 mg total) by mouth every 8 (eight) hours as needed for nausea or vomiting, Disp: 20 tablet, Rfl: 0    venlafaxine (EFFEXOR-XR) 37.5 mg 24 hr capsule, Take 37.5 mg by mouth daily, Disp: , Rfl:     Current Facility-Administered Medications:     acetaminophen (TYLENOL) tablet 975 mg, 975 mg, Oral, Once,     ibuprofen (MOTRIN) tablet 400 mg, 400 mg, Oral, Once,     ondansetron (ZOFRAN-ODT) dispersible tablet 8 mg, 8 mg, Oral, Once,      Current Allergies     Allergies as of 01/22/2025    (No Known Allergies)            The following portions of the patient's history were reviewed and updated as appropriate: allergies, current medications, past family history, past medical history, past social history, past surgical history and problem list.     No past medical history on file.    No past surgical history on file.    No family history on file.      Medications have been verified.        Objective   /84   Pulse (!) 122   Temp (!) 101.2 °F (38.4 °C)   Resp 20   LMP 01/04/2025   SpO2 97%   Patient's last menstrual period was 01/04/2025.       Physical Exam     Physical Exam  Vitals and nursing note reviewed.   Constitutional:       General: She is not in acute distress.     Appearance: Normal appearance. She is normal weight. She is not ill-appearing, toxic-appearing or diaphoretic.   HENT:      Head: Normocephalic and atraumatic.      Right Ear: Tympanic membrane, ear canal and external ear normal. There is no impacted cerumen.      Left Ear: Tympanic membrane, ear canal and external ear normal. There is no impacted cerumen.      Nose: Congestion and rhinorrhea present.      Mouth/Throat:      Mouth: Mucous membranes are moist.      Pharynx: No oropharyngeal exudate or posterior oropharyngeal erythema.   Eyes:      General: No scleral icterus.        Right eye: No discharge.         Left eye: No discharge.      Extraocular Movements: Extraocular movements intact.      Pupils: Pupils are equal, round, and reactive to light.   Cardiovascular:      Rate and Rhythm: Normal rate and regular rhythm.      Pulses: Normal pulses.           Carotid pulses are 2+ on the right side and 2+ on the left side.       Radial pulses are 2+ on the right side and 2+ on the left side.      Heart sounds: No murmur heard.     No friction rub. No gallop.   Pulmonary:      Effort: Pulmonary effort is normal. No respiratory distress.      Breath sounds: Normal  breath sounds. No stridor. No wheezing, rhonchi or rales.   Chest:      Chest wall: No tenderness.   Abdominal:      General: Abdomen is flat. Bowel sounds are normal. There is no distension.      Palpations: Abdomen is soft. There is no mass.      Tenderness: There is abdominal tenderness in the right lower quadrant, epigastric area and left upper quadrant. There is no right CVA tenderness, left CVA tenderness, guarding or rebound.      Hernia: No hernia is present.   Musculoskeletal:         General: No swelling, tenderness, deformity or signs of injury.      Cervical back: Normal range of motion and neck supple.      Right lower leg: No edema.      Left lower leg: No edema.   Skin:     General: Skin is warm and dry.   Neurological:      General: No focal deficit present.      Mental Status: She is alert and oriented to person, place, and time.   Psychiatric:         Mood and Affect: Mood normal.         Behavior: Behavior normal.

## 2025-01-22 NOTE — LETTER
January 22, 2025     Patient: Jenae Bose   YOB: 1995   Date of Visit: 1/22/2025       To Whom it May Concern:    Jenae Bose was seen in my clinic on 1/22/2025. She may return to work on 1/27/2025 .    If you have any questions or concerns, please don't hesitate to call.         Sincerely,          Alexander Montemayor,         CC: No Recipients

## 2025-01-23 LAB
FLUAV RNA RESP QL NAA+PROBE: NEGATIVE
FLUBV RNA RESP QL NAA+PROBE: NEGATIVE
SARS-COV-2 RNA RESP QL NAA+PROBE: NEGATIVE

## 2025-01-24 ENCOUNTER — TELEPHONE (OUTPATIENT)
Dept: ADMINISTRATIVE | Facility: OTHER | Age: 30
End: 2025-01-24

## 2025-01-24 ENCOUNTER — OFFICE VISIT (OUTPATIENT)
Dept: FAMILY MEDICINE CLINIC | Facility: CLINIC | Age: 30
End: 2025-01-24
Payer: COMMERCIAL

## 2025-01-24 VITALS
TEMPERATURE: 101.2 F | OXYGEN SATURATION: 97 % | HEART RATE: 107 BPM | WEIGHT: 168.4 LBS | HEIGHT: 62 IN | BODY MASS INDEX: 30.99 KG/M2 | DIASTOLIC BLOOD PRESSURE: 80 MMHG | SYSTOLIC BLOOD PRESSURE: 120 MMHG

## 2025-01-24 DIAGNOSIS — Z11.59 NEED FOR HEPATITIS C SCREENING TEST: ICD-10-CM

## 2025-01-24 DIAGNOSIS — E66.9 OBESITY (BMI 30-39.9): ICD-10-CM

## 2025-01-24 DIAGNOSIS — Z12.4 SCREENING FOR CERVICAL CANCER: ICD-10-CM

## 2025-01-24 DIAGNOSIS — F41.9 ANXIETY: ICD-10-CM

## 2025-01-24 DIAGNOSIS — Z11.4 SCREENING FOR HIV (HUMAN IMMUNODEFICIENCY VIRUS): ICD-10-CM

## 2025-01-24 DIAGNOSIS — Z00.01 ENCOUNTER FOR WELL ADULT EXAM WITH ABNORMAL FINDINGS: Primary | ICD-10-CM

## 2025-01-24 DIAGNOSIS — G89.29 CHRONIC RIGHT SHOULDER PAIN: ICD-10-CM

## 2025-01-24 DIAGNOSIS — J06.9 VIRAL URI: ICD-10-CM

## 2025-01-24 DIAGNOSIS — M25.551 RIGHT HIP PAIN: ICD-10-CM

## 2025-01-24 DIAGNOSIS — M25.511 CHRONIC RIGHT SHOULDER PAIN: ICD-10-CM

## 2025-01-24 PROBLEM — Z80.3 FAMILY HISTORY OF BREAST CANCER IN MOTHER: Status: ACTIVE | Noted: 2019-06-10

## 2025-01-24 PROCEDURE — 99204 OFFICE O/P NEW MOD 45 MIN: CPT

## 2025-01-24 PROCEDURE — 99395 PREV VISIT EST AGE 18-39: CPT

## 2025-01-24 NOTE — ASSESSMENT & PLAN NOTE
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  Will order blood work to rule out pharmacologic causes  Use heating pad to relax muscles; ice for acute pain  Use ibuprofen (Advil) 400 mg every 4 hours as needed for pain OR naproxen (Aleve) 500 mg every 8 hours  Adequately stretch with dynamic stretching at beginning of strength straining, then static stretching after  Go to ER if develop chest pain, weakness of arms/legs, loss of bladder/bowel control, or drooping of side of face and slurred speech  Orders:  •  C-reactive protein; Future  •  Cyclic citrul peptide antibody, IgG; Future  •  Lyme Total AB W Reflex to IGM/IGG; Future  •  RF Screen w/ Reflex to Titer; Future  •  Vitamin D 25 hydroxy; Future  •  HLA-B27 antigen; Future

## 2025-01-24 NOTE — ASSESSMENT & PLAN NOTE
New diagnosis, uncontrolled  Ordered blood work to rule out metabolic cause  Does do weight training and cardio  Recommend portion control, low carb diet, and resume physical activity once feeling better from viral URI  Orders:  •  Hemoglobin A1C; Future  •  Lipid panel; Future  •  TSH, 3rd generation with Free T4 reflex; Future  •  Comprehensive metabolic panel; Future  •  CBC and differential; Future

## 2025-01-24 NOTE — ASSESSMENT & PLAN NOTE
New diagnosis, acute, symptomatic. Neg flu/covid 2 days ago in urgent care  Sx for 4 days  Patient has had recurrent upper respiratory infections within the past 2 months.  If rheumatologic workup is negative, will consider referral to pulmonology for PFTs  Supportive treatment: Educated patient to stay hydrated, drinking at least 1.5 L water per day. Rest. Ibuprofen (Advil) as needed for pain (use instructions for dosing based on age) Acetaminophen (Tylenol) as needed for fever/pain (use instructions for dosing based on age). Hot water/tea with lemon or honey. Drink source of vitamin C (OJ, oranges, citrus fruits daily). Can use OTC flonase or mucinex for decongestant; Coricidin if you have high blood pressure. Wear a mask in public while symptomatic. Can return to work/school if 24 hours fever free or on antibiotic AND no longer symptomatic.   If symptoms worsen or persist, call the office to be re-evaluated        0

## 2025-01-24 NOTE — ASSESSMENT & PLAN NOTE
Chronic, uncontrolled  Right hip xray performed in 2022 which showed no abnormalities; did try PT at that time with minimal relief  Will order blood work to rule out rheumatologic causes  Use heating pad to relax muscles; ice for acute pain  Use ibuprofen (Advil) 400 mg every 4 hours as needed for pain OR naproxen (Aleve) 500 mg every 8 hours  Adequately stretch with dynamic stretching at beginning of strength straining, then static stretching after  Go to ER if develop chest pain, weakness of arms/legs, loss of bladder/bowel control, or drooping of side of face and slurred speech    Orders:  •  C-reactive protein; Future  •  Cyclic citrul peptide antibody, IgG; Future  •  Lyme Total AB W Reflex to IGM/IGG; Future  •  RF Screen w/ Reflex to Titer; Future  •  Vitamin D 25 hydroxy; Future  •  HLA-B27 antigen; Future

## 2025-01-24 NOTE — PATIENT INSTRUCTIONS
Go to lab for routine blood work AFTER fever free for 24 hours  Call the office if you would like to schedule a nurse visit for vaccines  Schedule an appointment with a dentist for yearly routine cleanings  Exercise at least 3 times per week, each of 30-40 minutes of moderate aerobic exercise  Eat at least 3-5 servings of fruits/vegetables every day  Drink at least 1.5-2L water per day  Call the office if develop any new symptoms  Go to ER if develop chest pain, SOB, blood in stool    Supportive care:  Stay hydrated, drinking at least 1.5-2 L water per day.   Rest.   Ibuprofen (Advil) as needed for pain (use instructions for dosing based on age)   Acetaminophen (Tylenol) as needed for fever/pain (use instructions for dosing based on age).   Hot water/tea with lemon or honey.   Drink source of vitamin C (OJ, oranges, citrus fruits daily).   Can use OTC flonase or mucines for decongestant; Coricidin if you have high blood pressure.   Change toothbrush and clean any mouth appliances after symptoms resolve  Wear a mask in public while symptomatic.   Can return to work/school if 24 hours fever free or on antibiotic AND no longer symptomatic.   If symptoms worsen or persist, call the office to be re-evaluated

## 2025-01-24 NOTE — PROGRESS NOTES
Adult Annual Physical  Name: Jenae Bose      : 1995      MRN: [id number]  Encounter Provider: Natalia Chopra PA-C  Encounter Date: 2025   Encounter department: [facility]    Assessment & Plan  Encounter for well adult exam with abnormal findings  Patient here for annual exam. Patient is new to practice and would like to establish care  Patient offered  no vaccines, patient with fever  Routine blood work ordered. Do not go for blood work until recovered from viral URI    Orders:  •  Comprehensive metabolic panel; Future  •  CBC and differential; Future    Viral URI  New diagnosis, acute, symptomatic. Neg flu/covid 2 days ago in urgent care  Sx for 4 days  Patient has had recurrent upper respiratory infections within the past 2 months.  If rheumatologic workup is negative, will consider referral to pulmonology for PFTs  Supportive treatment: Educated patient to stay hydrated, drinking at least 1.5 L water per day. Rest. Ibuprofen (Advil) as needed for pain (use instructions for dosing based on age) Acetaminophen (Tylenol) as needed for fever/pain (use instructions for dosing based on age). Hot water/tea with lemon or honey. Drink source of vitamin C (OJ, oranges, citrus fruits daily). Can use OTC flonase or mucinex for decongestant; Coricidin if you have high blood pressure. Wear a mask in public while symptomatic. Can return to work/school if 24 hours fever free or on antibiotic AND no longer symptomatic.   If symptoms worsen or persist, call the office to be re-evaluated         Obesity (BMI 30-39.9)  New diagnosis, uncontrolled  Ordered blood work to rule out metabolic cause  Does do weight training and cardio  Recommend portion control, low carb diet, and resume physical activity once feeling better from viral URI  Orders:  •  Hemoglobin A1C; Future  •  Lipid panel; Future  •  TSH, 3rd generation with Free T4 reflex; Future  •  Comprehensive metabolic  panel; Future  •  CBC and differential; Future    Right hip pain  Chronic, uncontrolled  Right hip xray performed in 2022 which showed no abnormalities; did try PT at that time with minimal relief  Will order blood work to rule out rheumatologic causes  Use heating pad to relax muscles; ice for acute pain  Use ibuprofen (Advil) 400 mg every 4 hours as needed for pain OR naproxen (Aleve) 500 mg every 8 hours  Adequately stretch with dynamic stretching at beginning of strength straining, then static stretching after  Go to ER if develop chest pain, weakness of arms/legs, loss of bladder/bowel control, or drooping of side of face and slurred speech    Orders:  •  C-reactive protein; Future  •  Cyclic citrul peptide antibody, IgG; Future  •  Lyme Total AB W Reflex to IGM/IGG; Future  •  RF Screen w/ Reflex to Titer; Future  •  Vitamin D 25 hydroxy; Future  •  HLA-B27 antigen; Future    Chronic right shoulder pain  Chronic, uncontrolled  Works at [email or web address] job, constantly sitting and using right arm for mouse  Will order blood work to rule out pharmacologic causes  Use heating pad to relax muscles; ice for acute pain  Use ibuprofen (Advil) 400 mg every 4 hours as needed for pain OR naproxen (Aleve) 500 mg every 8 hours  Adequately stretch with dynamic stretching at beginning of strength straining, then static stretching after  Go to ER if develop chest pain, weakness of arms/legs, loss of bladder/bowel control, or drooping of side of face and slurred speech  Orders:  •  C-reactive protein; Future  •  Cyclic citrul peptide antibody, IgG; Future  •  Lyme Total AB W Reflex to IGM/IGG; Future  •  RF Screen w/ Reflex to Titer; Future  •  Vitamin D 25 hydroxy; Future  •  HLA-B27 antigen; Future    Anxiety  Chronic, controlled  Hx of eating disorder 8-9 years ago, lost 68 lbs at that time  Has tried Zoloft and Effexor in the past; did not like effects  Offered medication, but patient feels symptoms are controlled currently  Will  follow up in 6 months  Patient to call office if need anything in the meantime       Need for hepatitis C screening test    Orders:  •  Hepatitis C Antibody; Future    Screening for HIV (human immunodeficiency virus)    Orders:  •  HIV 1/2 AG/AB w Reflex [facility] for 2 yr old and above; Future    Screening for cervical cancer  Last pap smear Dec 2023, no malignancy. Would like referral for [facility] for annuals  Orders:  •  Ambulatory referral to Obstetrics / Gynecology; Future    Immunizations and preventive care screenings were discussed with patient today. Appropriate education was printed on patient's after visit summary.    Counseling:  Alcohol/drug use: discussed moderation in alcohol intake, the recommendations for healthy alcohol use, and avoidance of illicit drug use.  Dental Health: discussed importance of regular tooth brushing, flossing, and dental visits.  Injury prevention: discussed safety/seat belts, safety helmets, smoke detectors, carbon monoxide detectors, and smoking near bedding or upholstery.  Sexual health: discussed sexually transmitted diseases, partner selection, use of condoms, avoidance of unintended pregnancy, and contraceptive alternatives.  Exercise: the importance of regular exercise/physical activity was discussed. Recommend exercise 3-5 times per week for at least 30 minutes.          History of Present Illness   {?Quick Links Encounters * My Last Note * Last Note in Specialty * Snapshot * Since Last Visit * History :96243}  Adult Annual Physical:  Patient presents for annual physical. Patient new to practice, would like to establish care and here for physical    Patient presenting for fever x 4 days  -Seen in the urgent care 2 days ago and tested negative for COVID and flu.  Was treated symptomatically with Sudafed and Mucinex and Flonase  -101.9 last night  -patient taking Nyquil  -nasal congestions, pt using saline rinses  -endorses cough, mucus, productive  -Endorses body  "aches, arthralgias, nausea for which she takes Zofran 8 mg as needed, headache  -Denies chest pain, shortness of breath, wheezing, abdominal pain, constipation, diarrhea, syncope, history of seizures  -No history of asthma, no family history of asthma  -Patient was diagnosed with sinus infection and put on antibiotic and steroids about 3 weeks ago    Right hip pain and right shoudler pain x 2 years  -Pain on the right hip is located near the iliac crest, radiates to the groin area  -Pain in the right shoulder is located at the inferior region of the right scapula  -\"feels like a pulling\" and feels stiff  -7/10  -Tylenol and Naproxen  -Patient had imaging done of right hip which showed no abnormalities 2 years ago  -Patient also did physical therapy for right hip 2 years ago and was told she has a weak core needs to strengthen her core for her hip pain  -Patient does work a desk job and feels like her pain in her shoulder and her hip is worse when she is sitting for prolonged periods of time  -Physical activity does improve the pain  -There is no timing of the day that worsens the pain  -No family history of rheumatologic diseases, asthma.     Diet and Physical Activity:  - Diet/Nutrition: well balanced diet.  - Exercise: strength training exercises and moderate cardiovascular exercise.    Depression Screening:  - PHQ-2 Score: 2    General Health:  - Sleep: 7-8 hours of sleep on average.  - Hearing: normal hearing right ear and normal hearing left ear.  - Vision: wears glasses, wears contacts and goes for regular eye exams.  - Dental: regular dental visits and brushes teeth twice daily.    /GYN Health:  - Follows with GYN: no.   - Menopause: premenopausal.   - Last menstrual cycle: 1/4/2025.   - History of STDs: no  - Contraception: barrier methods.      Advanced Care Planning:  - Has an advanced directive?: no    - Has a durable medical POA?: no    - ACP document given to patient?: no      Review of Systems " "  Constitutional:  Positive for fever. Negative for chills.   HENT:  Positive for congestion, rhinorrhea and sinus pressure. Negative for ear pain and sore throat.    Eyes:  Negative for pain and visual disturbance.   Respiratory:  Positive for cough. Negative for shortness of breath and wheezing.    Cardiovascular:  Negative for chest pain and palpitations.   Gastrointestinal:  Positive for nausea. Negative for abdominal pain, constipation, diarrhea and vomiting.   Genitourinary:  Negative for difficulty urinating, dysuria and hematuria.   Musculoskeletal:  Positive for arthralgias and myalgias. Negative for back pain, gait problem and joint swelling.   Skin:  Negative for color change and rash.   Allergic/Immunologic: Negative for environmental allergies.   Neurological:  Positive for headaches. Negative for dizziness, seizures, syncope and numbness.   Psychiatric/Behavioral:  Negative for sleep disturbance. The patient is not nervous/anxious.    All other systems reviewed and are negative.    Medical history: reviewed  Medications: reviewed  Specialists: reviewed  Immunizations: reviewed  Hospitalizations: reviewed  Surgeries: reviewed    Family history: reviewed    Smoking history: never  Alcohol: social  Recreation drugs: denies  Sexual activity: yes, male, 1 partner  Safe at home: yes  In school/work/retired: work      Objective {?Quick Links Trend Vitals * Enter New Vitals * Results Review * Timeline (Adult) * Labs * Imaging * Cardiology * Procedures * Lung Cancer Screening * Surgical eConsent :07021}  /80 (BP Location: Left arm, Patient Position: Sitting, Cuff Size: Standard)   Pulse (!) 107   Temp (!) 101.2 °F (38.4 °C) (Tympanic)   Ht 5' 1.81\" (1.57 m)   Wt 76.4 kg (168 lb 6.4 oz)   LMP 01/04/2025   SpO2 97%   BMI 30.99 kg/m²     Physical Exam  Vitals and nursing note reviewed.   Constitutional:       General: She is not in acute distress.     Appearance: She is well-developed.   HENT:      " Head: Normocephalic and atraumatic.      Right Ear: Tympanic membrane, ear canal and external ear normal.      Left Ear: Tympanic membrane, ear canal and external ear normal.      Nose: Congestion and rhinorrhea present.      Mouth/Throat:      Mouth: Mucous membranes are moist.      Pharynx: Oropharynx is clear. Posterior oropharyngeal erythema present.   Eyes:      Extraocular Movements: Extraocular movements intact.      Conjunctiva/sclera: Conjunctivae normal.      Pupils: Pupils are equal, round, and reactive to light.   Neck:      Thyroid: No thyroid mass or thyromegaly.   Cardiovascular:      Rate and Rhythm: Normal rate and regular rhythm.      Pulses: Normal pulses.      Heart sounds: No murmur heard.  Pulmonary:      Effort: Pulmonary effort is normal. No respiratory distress.      Breath sounds: Normal breath sounds. No wheezing, rhonchi or rales.   Abdominal:      General: Abdomen is flat.      Palpations: Abdomen is soft.      Tenderness: There is no abdominal tenderness.   Musculoskeletal:         General: No swelling. Normal range of motion.      Right shoulder: No swelling, deformity, tenderness or bony tenderness. Normal range of motion. Normal pulse.      Left shoulder: No swelling. Normal range of motion. Normal pulse.      Cervical back: Neck supple.      Right hip: No tenderness. Normal range of motion. Normal strength.      Left hip: Normal range of motion.   Lymphadenopathy:      Cervical: No cervical adenopathy.   Skin:     General: Skin is warm and dry.      Capillary Refill: Capillary refill takes less than 2 seconds.      Comments: No pitting nails   Neurological:      Mental Status: She is alert and oriented to person, place, and time.      Cranial Nerves: Cranial nerves 2-12 are intact.      Sensory: Sensation is intact.      Motor: Motor function is intact. No weakness.      Coordination: Coordination is intact.      Gait: Gait is intact.   Psychiatric:         Mood and Affect: Mood  normal.       Natalia Chopra PA-C

## 2025-01-24 NOTE — TELEPHONE ENCOUNTER
----- Message from Deandra STEELE sent at 1/24/2025  9:16 AM EST -----  Regarding: care gap request  01/24/25 9:16 AM    Hello, our patient attached above has had Pap Smear (HPV) aka Cervical Cancer Screening completed/performed. Please assist in updating the patient chart by pulling the Care Everywhere (CE) document. The date of service is 12/27/2023.     Thank you,  Deandra Valenzuela  [facility]

## 2025-01-24 NOTE — ASSESSMENT & PLAN NOTE
Chronic, controlled  Hx of eating disorder 8-9 years ago, lost 68 lbs at that time  Has tried Zoloft and Effexor in the past; did not like effects  Offered medication, but patient feels symptoms are controlled currently  Will follow up in 6 months  Patient to call office if need anything in the meantime

## 2025-01-27 NOTE — TELEPHONE ENCOUNTER
Upon review of the In Basket request we were able to locate, review, and update the patient chart as requested for Pap Smear (HPV) aka Cervical Cancer Screening.    Any additional questions or concerns should be emailed to the Practice Liaisons via the appropriate education email address, please do not reply via In Basket.    Thank you  Taisha Santillan MA   [facility]

## 2025-02-08 ENCOUNTER — LAB (OUTPATIENT)
Dept: LAB | Age: 30
End: 2025-02-08
Payer: COMMERCIAL

## 2025-02-08 DIAGNOSIS — Z11.4 SCREENING FOR HIV (HUMAN IMMUNODEFICIENCY VIRUS): ICD-10-CM

## 2025-02-08 DIAGNOSIS — M25.551 RIGHT HIP PAIN: ICD-10-CM

## 2025-02-08 DIAGNOSIS — M25.511 CHRONIC RIGHT SHOULDER PAIN: ICD-10-CM

## 2025-02-08 DIAGNOSIS — E66.9 OBESITY (BMI 30-39.9): ICD-10-CM

## 2025-02-08 DIAGNOSIS — Z11.59 NEED FOR HEPATITIS C SCREENING TEST: ICD-10-CM

## 2025-02-08 DIAGNOSIS — G89.29 CHRONIC RIGHT SHOULDER PAIN: ICD-10-CM

## 2025-02-08 DIAGNOSIS — Z00.01 ENCOUNTER FOR WELL ADULT EXAM WITH ABNORMAL FINDINGS: ICD-10-CM

## 2025-02-08 LAB
25(OH)D3 SERPL-MCNC: 18.5 NG/ML (ref 30–100)
ALBUMIN SERPL BCG-MCNC: 4.5 G/DL (ref 3.5–5)
ALP SERPL-CCNC: 51 U/L (ref 34–104)
ALT SERPL W P-5'-P-CCNC: 18 U/L (ref 7–52)
ANION GAP SERPL CALCULATED.3IONS-SCNC: 11 MMOL/L (ref 4–13)
AST SERPL W P-5'-P-CCNC: 16 U/L (ref 13–39)
BASOPHILS # BLD AUTO: 0.04 THOUSANDS/ΜL (ref 0–0.1)
BASOPHILS NFR BLD AUTO: 1 % (ref 0–1)
BILIRUB SERPL-MCNC: 0.34 MG/DL (ref 0.2–1)
BUN SERPL-MCNC: 16 MG/DL (ref 5–25)
CALCIUM SERPL-MCNC: 9.3 MG/DL (ref 8.4–10.2)
CHLORIDE SERPL-SCNC: 102 MMOL/L (ref 96–108)
CHOLEST SERPL-MCNC: 193 MG/DL (ref ?–200)
CO2 SERPL-SCNC: 24 MMOL/L (ref 21–32)
CREAT SERPL-MCNC: 0.49 MG/DL (ref 0.6–1.3)
CRP SERPL QL: 1.2 MG/L
EOSINOPHIL # BLD AUTO: 0.14 THOUSAND/ΜL (ref 0–0.61)
EOSINOPHIL NFR BLD AUTO: 3 % (ref 0–6)
ERYTHROCYTE [DISTWIDTH] IN BLOOD BY AUTOMATED COUNT: 11.8 % (ref 11.6–15.1)
GFR SERPL CREATININE-BSD FRML MDRD: 132 ML/MIN/1.73SQ M
GLUCOSE P FAST SERPL-MCNC: 105 MG/DL (ref 65–99)
HCT VFR BLD AUTO: 37.6 % (ref 34.8–46.1)
HDLC SERPL-MCNC: 58 MG/DL
HGB BLD-MCNC: 12.8 G/DL (ref 11.5–15.4)
IMM GRANULOCYTES # BLD AUTO: 0.01 THOUSAND/UL (ref 0–0.2)
IMM GRANULOCYTES NFR BLD AUTO: 0 % (ref 0–2)
LDLC SERPL CALC-MCNC: 107 MG/DL (ref 0–100)
LYMPHOCYTES # BLD AUTO: 2.16 THOUSANDS/ΜL (ref 0.6–4.47)
LYMPHOCYTES NFR BLD AUTO: 39 % (ref 14–44)
MCH RBC QN AUTO: 32 PG (ref 26.8–34.3)
MCHC RBC AUTO-ENTMCNC: 34 G/DL (ref 31.4–37.4)
MCV RBC AUTO: 94 FL (ref 82–98)
MONOCYTES # BLD AUTO: 0.36 THOUSAND/ΜL (ref 0.17–1.22)
MONOCYTES NFR BLD AUTO: 7 % (ref 4–12)
NEUTROPHILS # BLD AUTO: 2.86 THOUSANDS/ΜL (ref 1.85–7.62)
NEUTS SEG NFR BLD AUTO: 50 % (ref 43–75)
NONHDLC SERPL-MCNC: 135 MG/DL
NRBC BLD AUTO-RTO: 0 /100 WBCS
PLATELET # BLD AUTO: 466 THOUSANDS/UL (ref 149–390)
PMV BLD AUTO: 8.4 FL (ref 8.9–12.7)
POTASSIUM SERPL-SCNC: 3.9 MMOL/L (ref 3.5–5.3)
PROT SERPL-MCNC: 7.1 G/DL (ref 6.4–8.4)
RBC # BLD AUTO: 4 MILLION/UL (ref 3.81–5.12)
SODIUM SERPL-SCNC: 137 MMOL/L (ref 135–147)
TRIGL SERPL-MCNC: 142 MG/DL (ref ?–150)
TSH SERPL DL<=0.05 MIU/L-ACNC: 4.04 UIU/ML (ref 0.45–4.5)
WBC # BLD AUTO: 5.57 THOUSAND/UL (ref 4.31–10.16)

## 2025-02-08 PROCEDURE — 83036 HEMOGLOBIN GLYCOSYLATED A1C: CPT

## 2025-02-08 PROCEDURE — 86430 RHEUMATOID FACTOR TEST QUAL: CPT

## 2025-02-08 PROCEDURE — 82306 VITAMIN D 25 HYDROXY: CPT

## 2025-02-08 PROCEDURE — 86803 HEPATITIS C AB TEST: CPT

## 2025-02-08 PROCEDURE — 86200 CCP ANTIBODY: CPT

## 2025-02-08 PROCEDURE — 80053 COMPREHEN METABOLIC PANEL: CPT

## 2025-02-08 PROCEDURE — 84443 ASSAY THYROID STIM HORMONE: CPT

## 2025-02-08 PROCEDURE — 36415 COLL VENOUS BLD VENIPUNCTURE: CPT

## 2025-02-08 PROCEDURE — 80061 LIPID PANEL: CPT

## 2025-02-08 PROCEDURE — 85025 COMPLETE CBC W/AUTO DIFF WBC: CPT

## 2025-02-08 PROCEDURE — 86618 LYME DISEASE ANTIBODY: CPT

## 2025-02-08 PROCEDURE — 87389 HIV-1 AG W/HIV-1&-2 AB AG IA: CPT

## 2025-02-08 PROCEDURE — 86140 C-REACTIVE PROTEIN: CPT

## 2025-02-09 LAB
B BURGDOR IGG+IGM SER QL IA: NEGATIVE
EST. AVERAGE GLUCOSE BLD GHB EST-MCNC: 105 MG/DL
HBA1C MFR BLD: 5.3 %
HCV AB SER QL: NORMAL
HIV 1+2 AB+HIV1 P24 AG SERPL QL IA: NORMAL
HIV 2 AB SERPL QL IA: NORMAL
HIV1 AB SERPL QL IA: NORMAL
HIV1 P24 AG SERPL QL IA: NORMAL
RHEUMATOID FACT SER QL LA: NEGATIVE

## 2025-02-13 LAB — CCP AB SER IA-ACNC: 2.2 (ref ?–10)

## 2025-02-14 LAB — HLA-B27 QL NAA+PROBE: NEGATIVE

## 2025-02-17 ENCOUNTER — RA CDI HCC (OUTPATIENT)
Dept: OTHER | Facility: HOSPITAL | Age: 30
End: 2025-02-17

## 2025-02-17 PROBLEM — E66.811 CLASS 1 OBESITY DUE TO EXCESS CALORIES WITHOUT SERIOUS COMORBIDITY WITH BODY MASS INDEX (BMI) OF 30.0 TO 30.9 IN ADULT: Status: ACTIVE | Noted: 2025-01-24

## 2025-02-17 PROBLEM — E66.09 CLASS 1 OBESITY DUE TO EXCESS CALORIES WITHOUT SERIOUS COMORBIDITY WITH BODY MASS INDEX (BMI) OF 30.0 TO 30.9 IN ADULT: Status: ACTIVE | Noted: 2025-01-24

## 2025-02-17 NOTE — PATIENT INSTRUCTIONS
Start on azelastine nasal spray. ONLY USE FOR 2 WEEKS AT A TIME. Use 1 spray each nostril    Take vitamin D 50,0000 units for 12 weeks. Then, go to lab to test level. I will reach out with result then send over new prescription depending on the level.     Foods high in protein:  -milk  -eggs  -cheese, cottage cheese  -beef, chicken, pork  -fish  -beans  -quinoa  -avocadoes    Follow up in 6 months, get repeat blood work before 6 months

## 2025-02-17 NOTE — PROGRESS NOTES
Name: Jenae Bose      : 1995      MRN: [id number]  Encounter Provider: Natalia Chopra PA-C  Encounter Date: 2025   Encounter department: [facility]  :  Assessment & Plan  Chronic right shoulder pain         Right hip pain         Class 1 obesity due to excess calories without serious comorbidity with body mass index (BMI) of 30.0 to 30.9 in adult  {If prescribing weight loss medication, click here to fill out prior auth smartform and then hit F2 with this smartlist to insert prior auth documentation (Optional):[id number]}       Impaired fasting glucose  New diagnosis, acute asymptomatic    HgbA1c 5.3  Do not recommend starting medication at this point.   Recommend low carb diet  Will recheck in 6 months       Low serum creatinine  New diagnosis, acute asymptomatic  Cr 0.49, with no prior values to compare  eGFR normal 135  Due to normal eGFR value, will continue to monitor  Increase water intake to at least 2.5L water/day  Increase protein intake              History of Present Illness {?Quick Links Encounters * My Last Note * Last Note in Specialty * Snapshot * Since Last Visit * History :42636}  HPI  Review of Systems    Objective {?Quick Links Trend Vitals * Enter New Vitals * Results Review * Timeline (Adult) * Labs * Imaging * Cardiology * Procedures * Lung Cancer Screening * Surgical eConsent :54076}  There were no vitals taken for this visit.     Physical Exam  {Administrative / Billing Section (Optional):99111}

## 2025-02-18 ENCOUNTER — TELEMEDICINE (OUTPATIENT)
Dept: FAMILY MEDICINE CLINIC | Facility: CLINIC | Age: 30
End: 2025-02-18
Payer: COMMERCIAL

## 2025-02-18 ENCOUNTER — RESULTS FOLLOW-UP (OUTPATIENT)
Dept: FAMILY MEDICINE CLINIC | Facility: CLINIC | Age: 30
End: 2025-02-18

## 2025-02-18 DIAGNOSIS — E55.9 VITAMIN D DEFICIENCY: ICD-10-CM

## 2025-02-18 DIAGNOSIS — E66.09 CLASS 1 OBESITY DUE TO EXCESS CALORIES WITHOUT SERIOUS COMORBIDITY WITH BODY MASS INDEX (BMI) OF 30.0 TO 30.9 IN ADULT: Primary | ICD-10-CM

## 2025-02-18 DIAGNOSIS — M25.511 CHRONIC RIGHT SHOULDER PAIN: ICD-10-CM

## 2025-02-18 DIAGNOSIS — R09.81 NASAL CONGESTION: ICD-10-CM

## 2025-02-18 DIAGNOSIS — E66.811 CLASS 1 OBESITY DUE TO EXCESS CALORIES WITHOUT SERIOUS COMORBIDITY WITH BODY MASS INDEX (BMI) OF 30.0 TO 30.9 IN ADULT: Primary | ICD-10-CM

## 2025-02-18 DIAGNOSIS — R73.01 IMPAIRED FASTING GLUCOSE: ICD-10-CM

## 2025-02-18 DIAGNOSIS — M25.551 RIGHT HIP PAIN: ICD-10-CM

## 2025-02-18 DIAGNOSIS — J06.9 RECURRENT URI (UPPER RESPIRATORY INFECTION): ICD-10-CM

## 2025-02-18 DIAGNOSIS — G89.29 CHRONIC RIGHT SHOULDER PAIN: ICD-10-CM

## 2025-02-18 DIAGNOSIS — R79.89 LOW SERUM CREATININE: ICD-10-CM

## 2025-02-18 PROCEDURE — 99214 OFFICE O/P EST MOD 30 MIN: CPT

## 2025-02-18 RX ORDER — ERGOCALCIFEROL 1.25 MG/1
50000 CAPSULE, LIQUID FILLED ORAL WEEKLY
Qty: 12 CAPSULE | Refills: 0 | Status: SHIPPED | OUTPATIENT
Start: 2025-02-18 | End: 2025-05-07

## 2025-02-18 RX ORDER — AZELASTINE 1 MG/ML
1 SPRAY, METERED NASAL 2 TIMES DAILY
Qty: 1 ML | Refills: 0 | Status: SHIPPED | OUTPATIENT
Start: 2025-02-18

## 2025-02-18 NOTE — ASSESSMENT & PLAN NOTE
Acute on chronic, symptomatic  Using Flonase 2 sprays in each nostril every other day  Recommend take Flonase 2 sprays in each nostril daily  Recommend start on azelastine 1 spray into each nostril.  Only use this for 2 weeks.  Educated patient that this can cause rebound congestion  Orders:  •  azelastine (ASTELIN) 0.1 % nasal spray; 1 spray into each nostril 2 (two) times a day Use in each nostril as directed

## 2025-02-18 NOTE — ASSESSMENT & PLAN NOTE
New diagnosis, acute asymptomatic    HgbA1c 5.3  Do not recommend starting medication at this point.   Recommend low carb diet  Will recheck in 6 months

## 2025-02-18 NOTE — ASSESSMENT & PLAN NOTE
New diagnosis, acute, asymptomatic  Start on vitamin D 50,000 units once weekly for 12 weeks  We will then retest blood work to determine level, will then maintain on 2000 units daily  Orders:  •  ergocalciferol (VITAMIN D2) 50,000 units; Take 1 capsule (50,000 Units total) by mouth once a week for 12 doses  •  Vitamin D 25 hydroxy; Future

## 2025-02-18 NOTE — ASSESSMENT & PLAN NOTE
New diagnosis, acute, asymptomatic  Fasting glucose 105.  Hemoglobin A1c 5.3.  Discussed dietary modifications: Decreasing carb intake such as decreasing soda or juice, decreasing baked goods intake, eating healthy fats. Handout provided  We will retest blood work and follow-up in 6 months

## 2025-02-18 NOTE — PROGRESS NOTES
Virtual Regular VisitName: Jenae Bose      : 1995      MRN: [id number]  Encounter Provider: Natalia Chopra PA-C  Encounter Date: 2025   Encounter department: [facility]  :  Assessment & Plan  Class 1 obesity due to excess calories without serious comorbidity with body mass index (BMI) of 30.0 to 30.9 in adult  Chronic, uncontrolled increase in BMI  Blood work reviewed with patient: impaired fasting sugar, discussed low carb diet. Thyroid levels, cholesterol levels all normal. Will continue with dietary modifications.  BMI Counseling: The BMI is above normal. The BMI is above normal. Nutrition recommendations include reducing portion sizes, decreasing overall calorie intake, 3-5 servings of fruits/vegetables daily, reducing fast food intake, consuming healthier snacks, and decreasing soda and/or juice intake. Exercise recommendations include moderate aerobic physical activity for 150 minutes/week.         Chronic right shoulder pain  Chronic, fair control, currently asymptomatic  Rheumatologic workup all negative, results reviewed with patient  Discussed steroid injections, patient declines at this time  Use heating pad to relax muscles; ice for acute pain  Use ibuprofen (Advil) 400 mg every 4 hours as needed for pain OR naproxen (Aleve) 500 mg every 8 hours  Adequately stretch with dynamic stretching at beginning of strength straining, then static stretching after  Go to ER if develop chest pain, weakness of arms/legs, loss of bladder/bowel control, or drooping of side of face and slurred speech  If symptoms worsen or persist, call the office to rediscuss option of steroid injections       Right hip pain  Chronic, fair control, currently asymptomatic  Rheumatologic workup all negative, results reviewed with patient  Discussed steroid injections, patient declines at this time  Use heating pad to relax muscles; ice for acute pain  Use ibuprofen (Advil) 400  mg every 4 hours as needed for pain OR naproxen (Aleve) 500 mg every 8 hours  Adequately stretch with dynamic stretching at beginning of strength straining, then static stretching after  Go to ER if develop chest pain, weakness of arms/legs, loss of bladder/bowel control, or drooping of side of face and slurred speech  If symptoms worsen or persist, call the office to rediscuss option of steroid injections       Impaired fasting glucose  New diagnosis, acute, asymptomatic  Fasting glucose 105.  Hemoglobin A1c 5.3.  Discussed dietary modifications: Decreasing carb intake such as decreasing soda or juice, decreasing baked goods intake, eating healthy fats. Handout provided  We will retest blood work and follow-up in 6 months       Low serum creatinine  New diagnosis, acute, asymptomatic  Serum creatinine 0.49, no old blood work to compare.  EGFR normal 132  Discussed with patient that this is likely due to low muscle mass  Recommend increasing protein intake.  Will retest numbers and reevaluate in 6 months       Vitamin D deficiency  New diagnosis, acute, asymptomatic  Start on vitamin D 50,000 units once weekly for 12 weeks  We will then retest blood work to determine level, will then maintain on 2000 units daily  Orders:  •  ergocalciferol (VITAMIN D2) 50,000 units; Take 1 capsule (50,000 Units total) by mouth once a week for 12 doses  •  Vitamin D 25 hydroxy; Future    Nasal congestion  Acute on chronic, symptomatic  Using Flonase 2 sprays in each nostril every other day  Recommend take Flonase 2 sprays in each nostril daily  Recommend start on azelastine 1 spray into each nostril.  Only use this for 2 weeks.  Educated patient that this can cause rebound congestion  Orders:  •  azelastine (ASTELIN) 0.1 % nasal spray; 1 spray into each nostril 2 (two) times a day Use in each nostril as directed    Recurrent URI (upper respiratory infection)  New diagnosis, chronic, asymptomatic currently  Patient has had 4 viral  URIs and 1 sinus infection diagnosed and documented in the past year.  No history of asthma, no family history of asthma or atopic dermatitis, no known exposure to any allergens  Will send for pulmonary function tests.  Depending on result will consider referral to allergy testing or ENT for chronic nasal congestion  Orders:  •  Complete PFT with post bronchodilator; Future                   History of Present Illness {?Quick Links Encounters * My Last Note * Last Note in Specialty * Snapshot * Since Last Visit * History :87932}    Patient following up for chronic conditions    Patient states she has persistent nasal congestion for the past month.  She is been recurrently sick in the months of January and February.  According to electronic medical record, she has been diagnosed with 4 viral URIs in the past year as well as 1 sinus infection.  For her congestion she has been using Flonase 2 sprays in each nostril every other day.  She still does not have relief.  She has never been to an ENT or pulmonologist.  She has no history of asthma or atopic dermatitis.  She has no family history of asthma or atopic dermatitis.  She is not taking anything else over-the-counter    Patient states that her right shoulder pain and right hip pain come and go.  She states that it is more stiff throughout the day.  Especially when she sits for long periods of time which she does have a chest.  She is making ergonomic changes such as getting a standing desk so that she is able to stand during work.  She is not interested at steroid injections at this time.    She has not been to the gym the past couple of months due to recurrent illness.  She does plan to go back      Review of Systems   Constitutional:  Negative for chills and fever.   HENT:  Positive for congestion. Negative for sinus pressure, sinus pain, sneezing and sore throat.    Respiratory:  Negative for cough and shortness of breath.    Cardiovascular:  Negative for chest  pain and palpitations.   Gastrointestinal:  Negative for abdominal pain, constipation, diarrhea, nausea and vomiting.   Neurological:  Negative for syncope and headaches.       Objective {?Quick Links Trend Vitals * Enter New Vitals * Results Review * Timeline (Adult) * Labs * Imaging * Cardiology * Procedures * Lung Cancer Screening * Surgical eConsent :09617}  There were no vitals taken for this visit.    Physical Exam  Constitutional:       General: She is not in acute distress.     Appearance: She is well-developed.   HENT:      Head: Normocephalic and atraumatic.   Eyes:      Conjunctiva/sclera: Conjunctivae normal.   Pulmonary:      Effort: Pulmonary effort is normal. No respiratory distress.   Musculoskeletal:         General: No swelling.      Cervical back: Normal range of motion and neck supple.   Skin:     General: Skin is warm and dry.   Neurological:      Mental Status: She is alert.   Psychiatric:         Mood and Affect: Mood normal.         Administrative Statements   Encounter provider Natalia Chopra PA-C    The Patient is located at Home and in the following state in which I hold an active license [place].    The patient was identified by name and date of birth. Jenae Bose was informed that this is a telemedicine visit and that the visit is being conducted through the Epic Embedded platform. She agrees to proceed..  My office door was closed. No one else was in the room.  She acknowledged consent and understanding of privacy and security of the video platform. The patient has agreed to participate and understands they can discontinue the visit at any time.    I have spent a total time of 30 minutes in caring for this patient on the day of the visit/encounter including Diagnostic results, Prognosis, Risks and benefits of tx options, Instructions for management, Patient and family education, Importance of tx compliance, Documenting in the medical record, Reviewing/placing orders in the  medical record (including tests, medications, and/or procedures), and Obtaining or reviewing history  .    Natalia Chopra PA-C

## 2025-02-18 NOTE — ASSESSMENT & PLAN NOTE
Chronic, uncontrolled increase in BMI  Blood work reviewed with patient: impaired fasting sugar, discussed low carb diet. Thyroid levels, cholesterol levels all normal. Will continue with dietary modifications.  BMI Counseling: The BMI is above normal. The BMI is above normal. Nutrition recommendations include reducing portion sizes, decreasing overall calorie intake, 3-5 servings of fruits/vegetables daily, reducing fast food intake, consuming healthier snacks, and decreasing soda and/or juice intake. Exercise recommendations include moderate aerobic physical activity for 150 minutes/week.

## 2025-02-18 NOTE — ASSESSMENT & PLAN NOTE
New diagnosis, acute, asymptomatic  Serum creatinine 0.49, no old blood work to compare.  EGFR normal 132  Discussed with patient that this is likely due to low muscle mass  Recommend increasing protein intake.  Will retest numbers and reevaluate in 6 months

## 2025-02-18 NOTE — ASSESSMENT & PLAN NOTE
New diagnosis, chronic, asymptomatic currently  Patient has had 4 viral URIs and 1 sinus infection diagnosed and documented in the past year.  No history of asthma, no family history of asthma or atopic dermatitis, no known exposure to any allergens  Will send for pulmonary function tests.  Depending on result will consider referral to allergy testing or ENT for chronic nasal congestion  Orders:  •  Complete PFT with post bronchodilator; Future

## 2025-02-18 NOTE — ASSESSMENT & PLAN NOTE
Chronic, fair control, currently asymptomatic  Rheumatologic workup all negative, results reviewed with patient  Discussed steroid injections, patient declines at this time  Use heating pad to relax muscles; ice for acute pain  Use ibuprofen (Advil) 400 mg every 4 hours as needed for pain OR naproxen (Aleve) 500 mg every 8 hours  Adequately stretch with dynamic stretching at beginning of strength straining, then static stretching after  Go to ER if develop chest pain, weakness of arms/legs, loss of bladder/bowel control, or drooping of side of face and slurred speech  If symptoms worsen or persist, call the office to rediscuss option of steroid injections

## 2025-02-18 NOTE — ASSESSMENT & PLAN NOTE
CHEST 2 VIEWS ROUTINE



CLINICAL HISTORY: FEVER, COUGH dyspnea



COMPARISON STUDY:  5/27/2015



FINDINGS: The bones soft tissues and hemidiaphragms are normal. The

cardiomediastinal silhouette is normal. The lungs are clear. The pulmonary

vasculature is normal. 



IMPRESSION:  Negative chest. 









Electronically signed by:  Mike Renteria M.D.

5/8/2017 6:08 PM



Dictated Date/Time:  5/8/2017 6:07 PM New diagnosis, acute asymptomatic  Cr 0.49, with no prior values to compare  eGFR normal 135  Due to normal eGFR value, will continue to monitor  Increase water intake to at least 2.5L water/day  Increase protein intake